# Patient Record
Sex: MALE | Race: WHITE | NOT HISPANIC OR LATINO | URBAN - METROPOLITAN AREA
[De-identification: names, ages, dates, MRNs, and addresses within clinical notes are randomized per-mention and may not be internally consistent; named-entity substitution may affect disease eponyms.]

---

## 2019-10-30 LAB — HBA1C MFR BLD: 5.6 % (ref 0–5.7)

## 2019-11-04 ENCOUNTER — TRANSFERRED RECORDS (OUTPATIENT)
Dept: HEALTH INFORMATION MANAGEMENT | Facility: CLINIC | Age: 49
End: 2019-11-04

## 2019-11-06 ENCOUNTER — TRANSFERRED RECORDS (OUTPATIENT)
Dept: HEALTH INFORMATION MANAGEMENT | Facility: CLINIC | Age: 49
End: 2019-11-06

## 2019-12-23 ENCOUNTER — TRANSFERRED RECORDS (OUTPATIENT)
Dept: HEALTH INFORMATION MANAGEMENT | Facility: CLINIC | Age: 49
End: 2019-12-23

## 2019-12-24 LAB
ANION GAP SERPL CALC-SCNC: 7.9 MMOL/L (ref 5–18)
BUN SERPL-MCNC: 18 MG/DL (ref 7–20)
CALCIUM (EXTERNAL): 8.8 MG/DL (ref 8.5–10.2)
CHLORIDE (EXTERNAL): 105 MMOL/L (ref 98–109)
CO2 (EXTERNAL): 29 MMOL/L (ref 21–32)
CREATININE (EXTERNAL): 1.07 MG/DL (ref 0.4–1.2)
ERYTHROCYTE [DISTWIDTH] IN BLOOD BY AUTOMATED COUNT: 12.8 % (ref 11–14.5)
GFR ESTIMATED (EXTERNAL): >60 ML/MIN/1.73M2
GFR ESTIMATED (IF AFRICAN AMERICAN) (EXTERNAL): ABNORMAL ML/MIN/1.73M2
GLUCOSE (EXTERNAL): 133 MG/DL (ref 70–99)
HEMATOCRIT (EXTERNAL): 39.4 % (ref 40–50)
HEMOGLOBIN (EXTERNAL): 13.3 G/DL (ref 13–17)
MCH RBC QN AUTO: 29.9 PG (ref 28–34)
MCHC RBC AUTO-ENTMCNC: 33.8 G/DL (ref 32–36)
MCV RBC AUTO: 88.5 FL (ref 81–98)
PLATELET COUNT (EXTERNAL): 213 10E3/UL (ref 150–400)
POTASSIUM (EXTERNAL): 3.9 MMOL/L (ref 3.5–5.2)
RBC # BLD AUTO: 4.45 10E6/UL (ref 4.2–5.7)
SODIUM (EXTERNAL): 138 MMOL/L (ref 135–145)
WBC COUNT (EXTERNAL): 10.2 10E3/UL (ref 4.5–10.5)

## 2021-07-01 NOTE — PROGRESS NOTES
3  SUBJECTIVE:   CC: Nick Riley is an 51 year old male who presents for preventive health visit.     Patient has been advised of split billing requirements and indicates understanding: Yes  Healthy Habits:    Do you get at least three servings of calcium containing foods daily (dairy, green leafy vegetables, etc.)? yes    Amount of exercise or daily activities, outside of work: hasn't been lately    Problems taking medications regularly not applicable    Medication side effects: No    Have you had an eye exam in the past two years? yes    Do you see a dentist twice per year? yes    Do you have sleep apnea, excessive snoring or daytime drowsiness?no      PROBLEMS TO ADD ON...  No    Today's PHQ-2 Score:   PHQ-2 ( 1999 Pfizer) 7/5/2021   Q1: Little interest or pleasure in doing things 0   Q2: Feeling down, depressed or hopeless 0   PHQ-2 Score 0       Abuse: Current or Past(Physical, Sexual or Emotional)- No  Do you feel safe in your environment? Yes        Social History     Tobacco Use     Smoking status: Never Smoker     Smokeless tobacco: Never Used   Substance Use Topics     Alcohol use: Yes     Frequency: 4 or more times a week     If you drink alcohol do you typically have >3 drinks per day or >7 drinks per week? No                      Last PSA: No results found for: PSA    Reviewed orders with patient. Reviewed health maintenance and updated orders accordingly - Yes  Colonoscopy performed int 2019, found benign polyps, recommended repeat study in 5 years. (2024)  Prostate screen performed today    Reviewed and updated as needed this visit by clinical staff  Tobacco  Allergies  Meds  Problems  Med Hx  Surg Hx  Fam Hx  Soc Hx          Reviewed and updated as needed this visit by Provider  Tobacco  Allergies  Meds  Problems  Med Hx  Surg Hx  Fam Hx         History reviewed. No pertinent past medical history.   Past Surgical History:   Procedure Laterality Date     SPINE SURGERY      12/23/2019  "cervical spine surgery: fusion of c4-c7, removed c6       ROS:  See history of spina surgery above. Patient reports his \"new normal\" includes occasional LEFT foot drop, left sided weakness and occasional paresthesias  CONSTITUTIONAL: NEGATIVE for fever, chills  INTEGUMENTARY/SKIN: NEGATIVE for worrisome rashes, moles or lesions  EYES: NEGATIVE for vision changes or irritation  ENT: NEGATIVE for ear, mouth and throat problems  RESP: NEGATIVE for significant cough or SOB  CV: NEGATIVE for chest pain, palpitations or peripheral edema  GI: NEGATIVE for nausea, abdominal pain, heartburn, or change in bowel habits   male: negative for dysuria, hematuria, decreased urinary stream, erectile dysfunction, urethral discharge  MUSCULOSKELETAL: NEGATIVE for significant arthralgias or myalgia  NEURO: NEGATIVE for weakness, dizziness or paresthesias  PSYCHIATRIC: NEGATIVE for changes in mood or affect    OBJECTIVE:   /78 (BP Location: Left arm, Patient Position: Sitting, Cuff Size: Adult Large)   Pulse 71   Temp 97.2  F (36.2  C) (Temporal)   Resp 16   Ht 1.753 m (5' 9\")   Wt 93.2 kg (205 lb 8 oz)   SpO2 96%   BMI 30.35 kg/m    EXAM:  GENERAL: healthy, alert and no distress  EYES: Eyes grossly normal to inspection, PERRL and conjunctivae and sclerae normal  HENT: ear canals and TM's normal, nose and mouth without ulcers or lesions  NECK: no adenopathy, no asymmetry, masses, or scars and thyroid normal to palpation  RESP: lungs clear to auscultation - no rales, rhonchi or wheezes  CV: regular rate and rhythm, normal S1 S2, no S3 or S4, no murmur, click or rub, no peripheral edema and peripheral pulses strong  ABDOMEN: soft, nontender, no hepatosplenomegaly, no masses and bowel sounds normal  MS: minimal strength deficit and musculare defects on patients LEFT side related to previous surgery noted above, no edema  SKIN: no suspicious lesions or rashes  NEURO: Normal strength and tone, mentation intact and speech " "normal  PSYCH: mentation appears normal, affect normal/bright    Diagnostic Test Results:  Labs reviewed in Epic    ASSESSMENT/PLAN:   Nick was seen today for physical.    Diagnoses and all orders for this visit:    Routine general medical examination at a health care facility    Screening for lipid disorders  -     Lipid Panel ([facility])  -     Basic metabolic panel    Screening for prostate cancer  -     Prostate spec antigen screen        Patient has been advised of split billing requirements and indicates understanding: Yes  COUNSELING:  Reviewed preventive health counseling, as reflected in patient instructions    Estimated body mass index is 30.35 kg/m  as calculated from the following:    Height as of this encounter: 1.753 m (5' 9\").    Weight as of this encounter: 93.2 kg (205 lb 8 oz).    Weight management plan: Discussed healthy diet and exercise guidelines    He reports that he has never smoked. He has never used smokeless tobacco.      Counseling Resources:  ATP IV Guidelines  Pooled Cohorts Equation Calculator  FRAX Risk Assessment  ICSI Preventive Guidelines  Dietary Guidelines for Americans, 2010  USDA's MyPlate  ASA Prophylaxis  Lung CA Screening    Roman Delgado MD  [facility]  "

## 2021-07-05 ENCOUNTER — OFFICE VISIT (OUTPATIENT)
Dept: FAMILY MEDICINE | Facility: CLINIC | Age: 51
End: 2021-07-05
Payer: COMMERCIAL

## 2021-07-05 VITALS
SYSTOLIC BLOOD PRESSURE: 122 MMHG | BODY MASS INDEX: 30.44 KG/M2 | OXYGEN SATURATION: 96 % | DIASTOLIC BLOOD PRESSURE: 78 MMHG | HEIGHT: 69 IN | WEIGHT: 205.5 LBS | TEMPERATURE: 97.2 F | HEART RATE: 71 BPM | RESPIRATION RATE: 16 BRPM

## 2021-07-05 DIAGNOSIS — Z13.220 SCREENING FOR LIPID DISORDERS: ICD-10-CM

## 2021-07-05 DIAGNOSIS — Z12.5 SCREENING FOR PROSTATE CANCER: ICD-10-CM

## 2021-07-05 DIAGNOSIS — Z00.00 ROUTINE GENERAL MEDICAL EXAMINATION AT A HEALTH CARE FACILITY: Primary | ICD-10-CM

## 2021-07-05 PROBLEM — L30.9 ECZEMA: Status: ACTIVE | Noted: 2021-07-05

## 2021-07-05 PROBLEM — R03.0 ELEVATED BLOOD-PRESSURE READING WITHOUT DIAGNOSIS OF HYPERTENSION: Status: ACTIVE | Noted: 2021-07-05

## 2021-07-05 PROBLEM — B35.4 TINEA CORPORIS: Status: RESOLVED | Noted: 2021-07-05 | Resolved: 2021-07-05

## 2021-07-05 PROBLEM — B35.4 TINEA CORPORIS: Status: ACTIVE | Noted: 2021-07-05

## 2021-07-05 PROBLEM — N52.8 OTHER MALE ERECTILE DYSFUNCTION: Status: ACTIVE | Noted: 2021-07-05

## 2021-07-05 PROBLEM — Z86.0100 HISTORY OF COLONIC POLYPS: Status: ACTIVE | Noted: 2021-07-05

## 2021-07-05 PROBLEM — L30.9 ECZEMA: Status: RESOLVED | Noted: 2021-07-05 | Resolved: 2021-07-05

## 2021-07-05 PROBLEM — E66.9 OBESITY WITH BODY MASS INDEX 30 OR GREATER: Status: ACTIVE | Noted: 2021-07-05

## 2021-07-05 LAB
ANION GAP SERPL CALCULATED.3IONS-SCNC: 4 MMOL/L (ref 3–14)
BUN SERPL-MCNC: 21 MG/DL (ref 7–30)
CALCIUM SERPL-MCNC: 8.9 MG/DL (ref 8.5–10.1)
CHLORIDE SERPL-SCNC: 106 MMOL/L (ref 94–109)
CHOLEST SERPL-MCNC: 182 MG/DL (ref 0–200)
CHOLEST/HDLC SERPL: 5.2 {RATIO} (ref 0–5)
CO2 SERPL-SCNC: 27 MMOL/L (ref 20–32)
CREAT SERPL-MCNC: 1 MG/DL (ref 0.66–1.25)
FASTING SPECIMEN: YES
GFR SERPL CREATININE-BSD FRML MDRD: 86 ML/MIN/{1.73_M2}
GLUCOSE SERPL-MCNC: 89 MG/DL (ref 70–99)
HDLC SERPL-MCNC: 35 MG/DL
LDLC SERPL CALC-MCNC: 109 MG/DL (ref 0–129)
POTASSIUM SERPL-SCNC: 4.4 MMOL/L (ref 3.4–5.3)
PSA SERPL-ACNC: 0.44 UG/L (ref 0–4)
SODIUM SERPL-SCNC: 138 MMOL/L (ref 133–144)
TRIGL SERPL-MCNC: 188 MG/DL (ref 0–150)
VLDL-CHOLESTEROL: 38 (ref 7–32)

## 2021-07-05 SDOH — HEALTH STABILITY: MENTAL HEALTH: HOW OFTEN DO YOU HAVE A DRINK CONTAINING ALCOHOL?: 4 OR MORE TIMES A WEEK

## 2021-07-05 SDOH — HEALTH STABILITY: MENTAL HEALTH: HOW OFTEN DO YOU HAVE 6 OR MORE DRINKS ON ONE OCCASION?: NOT ASKED

## 2021-07-05 SDOH — HEALTH STABILITY: MENTAL HEALTH: HOW MANY STANDARD DRINKS CONTAINING ALCOHOL DO YOU HAVE ON A TYPICAL DAY?: NOT ASKED

## 2021-07-05 ASSESSMENT — MIFFLIN-ST. JEOR: SCORE: 1772.52

## 2021-10-17 ENCOUNTER — HEALTH MAINTENANCE LETTER (OUTPATIENT)
Age: 51
End: 2021-10-17

## 2022-06-28 NOTE — PROGRESS NOTES
Assessment & Plan   Problem List Items Addressed This Visit        Nervous and Auditory    Nerve damage      Other Visit Diagnoses     Hematuria, unspecified type    -  Primary    Relevant Orders    Urinalysis Macroscopic (Completed)    Erectile dysfunction, unspecified erectile dysfunction type        Relevant Medications    sildenafil (VIAGRA) 25 MG tablet        Unclear cause for hematuria. Considered kidney stone vs infection vs medication vs dehydration vs trauma. Reassured with apparent resolution of symptoms. Encouraged Nick to monitor for possible return of symptoms as I would consider renal ultrasound vs CT vs referral to urology for consideration of cystoscopy.    Agreed to a trial of viagra today. Advised Nick to use with caution, especially initially, for concerns for possible hypotension and syncope.      29 minutes spent on the date of the encounter doing chart review, history and exam, documentation and further activities as noted.    Roman Delgado MD  [facility]    Subjective   Nick is a 52 year old presenting for the following health issues:  Kidney Problem (Patient started noticing blood in his urine on Sunday and had mild pain. But since then it has slowly subsided. )      HPI   Hematuria  - noticed it three days ago with some associated pain  (Of note, Nick has limited sensation in his penis due to previous medical issues as noted in chart. As he puts it, there has to be some pretty significant pain for him to feel anything.)   - initial void was clearly blood  - over the next 2-3 voids the blood we less present, as was the pain  - symptoms completely resolved but that night  - no return of symptoms since then  - no fever, chills, nausea  - can't really feel if there is flank pain on the RIGHT as noted above  - issues with sexual function as noted below have been present since before this new issue    ED  - since the loss of sensation noted above, Nick notes he can achieve an erection  but can have difficulty maintaining an erection  - interested in trying Viagra to see if that helps      Review of Systems   Constitutional, HEENT, cardiovascular, pulmonary, gi and gu systems are negative, except as otherwise noted.      Objective    /82 (BP Location: Right arm, Patient Position: Chair, Cuff Size: Adult Large)   Pulse 64   Temp 97.5  F (36.4  C) (Temporal)   Resp 16   Wt 95.7 kg (210 lb 14.1 oz)   SpO2 98%   BMI 31.14 kg/m    Body mass index is 31.14 kg/m .  Physical Exam   GENERAL: healthy, alert and no distress  NECK: no adenopathy, no asymmetry, masses, or scars and thyroid normal to palpation  RESP: lungs clear to auscultation - no rales, rhonchi or wheezes  CV: regular rate and rhythm, normal S1 S2, no S3 or S4, no murmur, click or rub, no peripheral edema and peripheral pulses strong  ABDOMEN: soft, nontender, no hepatosplenomegaly, no masses and bowel sounds normal   (male): normal male genitalia without lesions or urethral discharge, no hernia  MS: no gross musculoskeletal defects noted, no edema            .  ..

## 2022-06-29 ENCOUNTER — OFFICE VISIT (OUTPATIENT)
Dept: FAMILY MEDICINE | Facility: CLINIC | Age: 52
End: 2022-06-29
Payer: COMMERCIAL

## 2022-06-29 VITALS
BODY MASS INDEX: 31.14 KG/M2 | DIASTOLIC BLOOD PRESSURE: 82 MMHG | RESPIRATION RATE: 16 BRPM | HEART RATE: 64 BPM | SYSTOLIC BLOOD PRESSURE: 127 MMHG | OXYGEN SATURATION: 98 % | TEMPERATURE: 97.5 F | WEIGHT: 210.88 LBS

## 2022-06-29 DIAGNOSIS — N52.9 ERECTILE DYSFUNCTION, UNSPECIFIED ERECTILE DYSFUNCTION TYPE: ICD-10-CM

## 2022-06-29 DIAGNOSIS — R31.9 HEMATURIA, UNSPECIFIED TYPE: Primary | ICD-10-CM

## 2022-06-29 DIAGNOSIS — T14.8XXA NERVE DAMAGE: ICD-10-CM

## 2022-06-29 LAB
ALBUMIN UR-MCNC: NEGATIVE MG/DL
APPEARANCE UR: CLEAR
BILIRUB UR QL STRIP: NEGATIVE
COLOR UR AUTO: YELLOW
GLUCOSE UR STRIP-MCNC: NEGATIVE MG/DL
HGB UR QL STRIP: NEGATIVE
KETONES UR STRIP-MCNC: NEGATIVE MG/DL
LEUKOCYTE ESTERASE UR QL STRIP: NEGATIVE
NITRATE UR QL: NEGATIVE
PH UR STRIP: 7 [PH] (ref 5–7)
SP GR UR STRIP: 1.01 (ref 1–1.03)
UROBILINOGEN UR STRIP-ACNC: 0.2 E.U./DL

## 2022-06-29 RX ORDER — SILDENAFIL 25 MG/1
25 TABLET, FILM COATED ORAL DAILY PRN
Qty: 10 TABLET | Refills: 0 | Status: SHIPPED | OUTPATIENT
Start: 2022-06-29 | End: 2024-08-26

## 2022-06-29 ASSESSMENT — PAIN SCALES - GENERAL: PAINLEVEL: NO PAIN (0)

## 2022-06-29 NOTE — NURSING NOTE
52 year old  Chief Complaint   Patient presents with     Kidney Problem     Patient started noticing blood in his urine on Sunday and had mild pain. But since then it has slowly subsided.        Blood pressure 127/82, pulse 64, temperature 97.5  F (36.4  C), temperature source Temporal, resp. rate 16, weight 95.7 kg (210 lb 14.1 oz), SpO2 98 %. Body mass index is 31.14 kg/m .  Patient Active Problem List   Diagnosis     Elevated blood-pressure reading without diagnosis of hypertension     Hypogonadism     Obesity with body mass index 30 or greater     Other male erectile dysfunction     History of colonic polyps       Wt Readings from Last 2 Encounters:   06/29/22 95.7 kg (210 lb 14.1 oz)   07/05/21 93.2 kg (205 lb 8 oz)     BP Readings from Last 3 Encounters:   06/29/22 127/82   07/05/21 122/78         Current Outpatient Medications   Medication     cholecalciferol 50 MCG (2000 UT) CAPS     Multiple Vitamin (MULTI-VITAMIN DAILY PO)     No current facility-administered medications for this visit.       Social History     Tobacco Use     Smoking status: Never Smoker     Smokeless tobacco: Never Used   Substance Use Topics     Alcohol use: Yes     Drug use: Not Currently       Health Maintenance Due   Topic Date Due     ADVANCE CARE PLANNING  Never done     COLORECTAL CANCER SCREENING  Never done     HIV SCREENING  Never done     HEPATITIS C SCREENING  Never done     ZOSTER IMMUNIZATION (1 of 2) Never done     PREVENTIVE CARE VISIT  07/05/2022       No results found for: CARSON Mike CMA, [facility]  June 29, 2022 8:35 AM

## 2022-09-12 ENCOUNTER — NURSE TRIAGE (OUTPATIENT)
Dept: FAMILY MEDICINE | Facility: CLINIC | Age: 52
End: 2022-09-12

## 2022-09-12 NOTE — TELEPHONE ENCOUNTER
*Put Sx in reason for call  Symptom: Patient tested positive 9/11, current symptoms congestion, chills, headache, sore throat, and cough. Patient noticed first symptoms on Saturday afternoon.  Additional Comments:  Would like to speak with RN about what to do next

## 2022-09-12 NOTE — TELEPHONE ENCOUNTER
COVID Medication Reconciliation  ~Sildenafil - Hold x 8 days    Pharm[facility]:  [facility] (They do have Paxlovid in stock)    Reason for Disposition    [1] COVID-19 diagnosed by positive lab test (e.g., PCR, rapid self-test kit) AND [2] mild symptoms (e.g., cough, fever, others) AND [3] no complications or SOB    Answer Assessment - Initial Assessment Questions  1. COVID-19 DIAGNOSIS: Positive COVID test 9/11/22  2. COVID-19 EXPOSURE: Went to an event on 9/8/22  3. ONSET: Saturday 9/10/22  4. WORST SYMPTOM: Headache  5. COUGH: Intermittent   6. FEVER: Uncertain - hasn't checked it  7. RESPIRATORY STATUS: Breathing normally  8. BETTER-SAME-WORSE: Better, chills have subsided  9. HIGH RISK DISEASE: BMI > 30   10. VACCINE: Pfizer 3/19/21, 4/9/21  11. BOOSTER: Moderna 11/19/21, 6/5/22  12. PREGNANCY: N/A  13. OTHER SYMPTOMS: Sinus congestion, chills, sore throat and body aches  14. O2 SATURATION MONITOR:  No    Protocols used: CORONAVIRUS (COVID-19) DIAGNOSED OR [id number]    Isolation and over the counter medications discussed.  Nick would like to continue with these being he is feeling a little better than yesterday.  If he is feeling worse or not any better by Wednesday, which is his 5 day window for Paxlovid treatment, he will call for an appointment on Wednesday the 14th.  LYNNE McraeN, RN, [facility]  09/12/22  10:31 AM

## 2022-10-03 ENCOUNTER — HEALTH MAINTENANCE LETTER (OUTPATIENT)
Age: 52
End: 2022-10-03

## 2023-10-21 ENCOUNTER — HEALTH MAINTENANCE LETTER (OUTPATIENT)
Age: 53
End: 2023-10-21

## 2024-08-26 ENCOUNTER — OFFICE VISIT (OUTPATIENT)
Dept: FAMILY MEDICINE | Facility: CLINIC | Age: 54
End: 2024-08-26
Payer: COMMERCIAL

## 2024-08-26 VITALS
SYSTOLIC BLOOD PRESSURE: 134 MMHG | WEIGHT: 210 LBS | HEIGHT: 69 IN | OXYGEN SATURATION: 97 % | RESPIRATION RATE: 16 BRPM | DIASTOLIC BLOOD PRESSURE: 84 MMHG | TEMPERATURE: 98.1 F | BODY MASS INDEX: 31.1 KG/M2 | HEART RATE: 56 BPM

## 2024-08-26 DIAGNOSIS — Z13.228 SCREENING FOR METABOLIC DISORDER: ICD-10-CM

## 2024-08-26 DIAGNOSIS — Z00.00 WELLNESS EXAMINATION: Primary | ICD-10-CM

## 2024-08-26 DIAGNOSIS — Z13.220 LIPID SCREENING: ICD-10-CM

## 2024-08-26 DIAGNOSIS — Z12.11 SCREEN FOR COLON CANCER: ICD-10-CM

## 2024-08-26 DIAGNOSIS — Z12.5 SCREENING FOR PROSTATE CANCER: ICD-10-CM

## 2024-08-26 LAB
ANION GAP SERPL CALCULATED.3IONS-SCNC: 10 MMOL/L (ref 7–15)
BUN SERPL-MCNC: 12 MG/DL (ref 6–20)
CALCIUM SERPL-MCNC: 9.5 MG/DL (ref 8.8–10.4)
CHLORIDE SERPL-SCNC: 103 MMOL/L (ref 98–107)
CHOLEST SERPL-MCNC: 184 MG/DL
CREAT SERPL-MCNC: 1.04 MG/DL (ref 0.67–1.17)
EGFRCR SERPLBLD CKD-EPI 2021: 85 ML/MIN/1.73M2
FASTING STATUS PATIENT QL REPORTED: YES
FASTING STATUS PATIENT QL REPORTED: YES
GLUCOSE SERPL-MCNC: 100 MG/DL (ref 70–99)
HCO3 SERPL-SCNC: 26 MMOL/L (ref 22–29)
HDLC SERPL-MCNC: 29 MG/DL
LDLC SERPL CALC-MCNC: 122 MG/DL
NONHDLC SERPL-MCNC: 155 MG/DL
POTASSIUM SERPL-SCNC: 4.4 MMOL/L (ref 3.4–5.3)
PSA SERPL DL<=0.01 NG/ML-MCNC: 0.4 NG/ML (ref 0–3.5)
SODIUM SERPL-SCNC: 139 MMOL/L (ref 135–145)
TRIGL SERPL-MCNC: 166 MG/DL

## 2024-08-26 PROCEDURE — 80061 LIPID PANEL: CPT | Performed by: FAMILY MEDICINE

## 2024-08-26 PROCEDURE — 82374 ASSAY BLOOD CARBON DIOXIDE: CPT | Performed by: FAMILY MEDICINE

## 2024-08-26 PROCEDURE — 84153 ASSAY OF PSA TOTAL: CPT | Performed by: FAMILY MEDICINE

## 2024-08-26 SDOH — HEALTH STABILITY: PHYSICAL HEALTH: ON AVERAGE, HOW MANY MINUTES DO YOU ENGAGE IN EXERCISE AT THIS LEVEL?: 90 MIN

## 2024-08-26 SDOH — HEALTH STABILITY: PHYSICAL HEALTH: ON AVERAGE, HOW MANY DAYS PER WEEK DO YOU ENGAGE IN MODERATE TO STRENUOUS EXERCISE (LIKE A BRISK WALK)?: 2 DAYS

## 2024-08-26 ASSESSMENT — SOCIAL DETERMINANTS OF HEALTH (SDOH): HOW OFTEN DO YOU GET TOGETHER WITH FRIENDS OR RELATIVES?: ONCE A WEEK

## 2024-08-26 NOTE — NURSING NOTE
"54 year old  Chief Complaint   Patient presents with    Physical     No concerns.       Blood pressure 134/84, pulse 56, temperature 98.1  F (36.7  C), temperature source Skin, resp. rate 16, height 1.753 m (5' 9\"), weight 95.3 kg (210 lb), SpO2 97%. Body mass index is 31.01 kg/m .  Patient Active Problem List   Diagnosis    Elevated blood-pressure reading without diagnosis of hypertension    Hypogonadism    Obesity with body mass index 30 or greater    Other male erectile dysfunction    History of colonic polyps    Nerve damage       Wt Readings from Last 2 Encounters:   08/26/24 95.3 kg (210 lb)   06/29/22 95.7 kg (210 lb 14.1 oz)     BP Readings from Last 3 Encounters:   08/26/24 134/84   06/29/22 127/82   07/05/21 122/78         Current Outpatient Medications   Medication Sig Dispense Refill    cholecalciferol 50 MCG (2000 UT) CAPS every 24 hours      Multiple Vitamin (MULTI-VITAMIN DAILY PO) Multi Vitamin   QD      sildenafil (VIAGRA) 25 MG tablet Take 1 tablet (25 mg) by mouth daily as needed (erectile dysfunction) 10 tablet 0     No current facility-administered medications for this visit.       Social History     Tobacco Use    Smoking status: Never    Smokeless tobacco: Never   Substance Use Topics    Alcohol use: Yes     Comment: rarely    Drug use: Not Currently       Health Maintenance Due   Topic Date Due    ADVANCE CARE PLANNING  Never done    COLORECTAL CANCER SCREENING  Never done    HIV SCREENING  Never done    HEPATITIS C SCREENING  Never done    HEPATITIS B IMMUNIZATION (1 of 3 - 19+ 3-dose series) Never done    ZOSTER IMMUNIZATION (1 of 2) Never done    YEARLY PREVENTIVE VISIT  07/05/2022    COVID-19 Vaccine (6 - 2023-24 season) 09/01/2023    GLUCOSE  07/05/2024       No results found for: \"PAP\"      August 26, 2024 9:47 AM   "

## 2024-08-26 NOTE — PROGRESS NOTES
"Preventive Care Visit  [facility]  Roman Delgado MD, Family Medicine  Aug 26, 2024      Assessment & Plan   Problem List Items Addressed This Visit    None  Visit Diagnoses       Wellness examination    -  Primary    Lipid screening        Relevant Orders    Lipid Profile    Screening for metabolic disorder        Relevant Orders    Basic metabolic panel    Screening for prostate cancer        Relevant Orders    PSA tumor marker    Screen for colon cancer        Relevant Orders    Colonoscopy Screening  Referral             Patient has been advised of split billing requirements and indicates understanding: Yes       BMI  Estimated body mass index is 31.01 kg/m  as calculated from the following:    Height as of this encounter: 1.753 m (5' 9\").    Weight as of this encounter: 95.3 kg (210 lb).   Weight management plan: Discussed healthy diet and exercise guidelines    Counseling  Appropriate preventive services were addressed with this patient via screening, questionnaire, or discussion as appropriate for fall prevention, nutrition, physical activity, Tobacco-use cessation, social engagement, weight loss and cognition.  Checklist reviewing preventive services available has been given to the patient.  Reviewed patient's diet, addressing concerns and/or questions.   He is at risk for lack of exercise and has been provided with information to increase physical activity for the benefit of his well-being.   He is at risk for psychosocial distress and has been provided with information to reduce risk.     Roman Delgado MD  10:16 AM, August 26, 2024      Alex   Nick is a 54 year old, presenting for the following:  Physical (No concerns.)         Health Care Directive  Patient does not have a Health Care Directive or Living Will: Discussed advance care planning with patient; information given to patient to review.    HPI  # Health Maintenance  - BP:   BP Readings from Last 3 Encounters:   08/26/24 134/84 "   06/29/22 127/82   07/05/21 122/78   - Cholesterol: pending  Recent Labs   Lab Test 07/05/21  1036   CHOL 182.0   HDL 35.0*   .0   TRIG 188.0*   The 10-year ASCVD risk score (Jaxon HERNANDEZ, et al., 2019) is: 8.3%*    Values used to calculate the score:      Age: 54 years      Sex: Male      Is Non- : No      Diabetic: No      Tobacco smoker: No      Systolic Blood Pressure: 134 mmHg      Is BP treated: No      HDL Cholesterol: 35 mg/dL*      Total Cholesterol: 5.2 mmol/L*      * - Cholesterol units were assumed for this score calculation  - Diabetes Screening: pending  - Lung Cancer Screening: not indicated  55-79yo w/30py smoking history and currently smoking OR quit within past 15 years:  Low dose CT annually and discontinued once a person has been 15 years tobacco free  - (+) seatbelt use, (+) helmet, (+) smoke detector  - Feels safe at home, denies verbal/physical/emotional abuse in past year: yes          8/26/2024   General Health   How would you rate your overall physical health? Good   Feel stress (tense, anxious, or unable to sleep) Only a little      (!) STRESS CONCERN      8/26/2024   Nutrition   Three or more servings of calcium each day? Yes   Diet: Regular (no restrictions)   How many servings of fruit and vegetables per day? (!) 2-3   How many sweetened beverages each day? 0-1            8/26/2024   Exercise   Days per week of moderate/strenous exercise 2 days   Average minutes spent exercising at this level 90 min      (!) EXERCISE CONCERN      8/26/2024   Social Factors   Frequency of gathering with friends or relatives Once a week   Worry food won't last until get money to buy more No   Food not last or not have enough money for food? No   Do you have housing? (Housing is defined as stable permanent housing and does not include staying ouside in a car, in a tent, in an abandoned building, in an overnight shelter, or couch-surfing.) Yes   Are you worried about losing your  housing? No   Lack of transportation? No   Unable to get utilities (heat,electricity)? No            8/26/2024   Fall Risk   Fallen 2 or more times in the past year? No   Trouble with walking or balance? Yes   Reason Gait Speed Test Not Completed Patient declines   Reason for decline Patient had spine surgery.             8/26/2024   Dental   Dentist two times every year? Yes            8/26/2024   TB Screening   Were you born outside of the [place]? No            Today's PHQ-2 Score:       8/26/2024     7:07 AM   PHQ-2 ( 1999 Pfizer)   Q1: Little interest or pleasure in doing things 0   Q2: Feeling down, depressed or hopeless 0   PHQ-2 Score 0   Q1: Little interest or pleasure in doing things Not at all   Q2: Feeling down, depressed or hopeless Not at all   PHQ-2 Score 0           8/26/2024   Substance Use   Alcohol more than 3/day or more than 7/wk No   Do you use any other substances recreationally? No        Social History     Tobacco Use    Smoking status: Never    Smokeless tobacco: Never   Substance Use Topics    Alcohol use: Yes     Comment: rarely    Drug use: Not Currently           8/26/2024   One time HIV Screening   Previous HIV test? Yes          8/26/2024   STI Screening   New sexual partner(s) since last STI/HIV test? No      Last PSA:   PSA   Date Value Ref Range Status   07/05/2021 0.44 0 - 4 ug/L Final     Comment:     Assay Method:  Chemiluminescence using Siemens Vista analyzer       Past Medical History:   Diagnosis Date    Nerve damage 6/29/2022    RLE, groin, and torso has no pain sensation     Past Surgical History:   Procedure Laterality Date    SPINE SURGERY      12/23/2019 cervical spine surgery: fusion of c4-c7, removed c6         Review of Systems  Constitutional, HEENT, cardiovascular, pulmonary, gi and gu systems are negative, except as otherwise noted.     Objective    Exam  /84 (BP Location: Left arm, Patient Position: Sitting, Cuff Size: Adult Regular)   Pulse 56   Temp 98.1  F  "(36.7  C) (Skin)   Resp 16   Ht 1.753 m (5' 9\")   Wt 95.3 kg (210 lb)   SpO2 97%   BMI 31.01 kg/m     Estimated body mass index is 31.01 kg/m  as calculated from the following:    Height as of this encounter: 1.753 m (5' 9\").    Weight as of this encounter: 95.3 kg (210 lb).    Physical Exam  GENERAL: alert and no distress  NECK: no adenopathy, no asymmetry, masses, or scars  RESP: lungs clear to auscultation - no rales, rhonchi or wheezes  CV: regular rate and rhythm, normal S1 S2, no S3 or S4, no murmur, click or rub, no peripheral edema  ABDOMEN: soft, nontender, no hepatosplenomegaly, no masses and bowel sounds normal  MS: no gross musculoskeletal defects noted, no edema        Signed Electronically by: Roman Delgado MD    "

## 2024-08-27 ENCOUNTER — MYC MEDICAL ADVICE (OUTPATIENT)
Dept: FAMILY MEDICINE | Facility: CLINIC | Age: 54
End: 2024-08-27

## 2024-08-27 ENCOUNTER — PATIENT OUTREACH (OUTPATIENT)
Dept: GASTROENTEROLOGY | Facility: CLINIC | Age: 54
End: 2024-08-27
Payer: COMMERCIAL

## 2024-08-27 DIAGNOSIS — Z71.89 ENCOUNTER FOR CARDIAC RISK COUNSELING: Primary | ICD-10-CM

## 2024-08-27 NOTE — TELEPHONE ENCOUNTER
correspondence. Coronary calcium CT scan as ordered.     Diagnoses and all orders for this visit:    Encounter for cardiac risk counseling  -     CT Coronary Calcium Scan; Future      Roman Delgado MD  1:42 PM, August 27, 2024

## 2024-09-05 ENCOUNTER — TELEPHONE (OUTPATIENT)
Dept: GASTROENTEROLOGY | Facility: CLINIC | Age: 54
End: 2024-09-05
Payer: COMMERCIAL

## 2024-09-05 NOTE — TELEPHONE ENCOUNTER
"Endoscopy Scheduling Screen    Have you had a positive Covid test in the last 14 days?  No    What is your communication preference for Instructions and/or Bowel Prep?   MyChart    What insurance is in the chart?  Other:  BC[employer]    Ordering/Referring Provider:     GOOD MESSINA      (If ordering provider performs procedure, schedule with ordering provider unless otherwise instructed. )    BMI: Estimated body mass index is 31.01 kg/m  as calculated from the following:    Height as of 8/26/24: 1.753 m (5' 9\").    Weight as of 8/26/24: 95.3 kg (210 lb).     Sedation Ordered  moderate sedation.   If patient BMI > 50 do not schedule in [facility].    If patient BMI > 45 do not schedule at [facility].    Are you taking methadone or Suboxone?  No    Have you had difficulties, pain, or discomfort during past endoscopy procedures?  No    Are you taking any prescription medications for pain 3 or more times per week?   NO, No RN review required.    Do you have a history of malignant hyperthermia?  No    (Females) Are you currently pregnant?        Have you been diagnosed or told you have pulmonary hypertension?   No    Do you have an LVAD?  No    Have you been told you have moderate to severe sleep apnea?  No    Have you been told you have COPD, asthma, or any other lung disease?  No    Do you have any heart conditions?  No     Have you ever had or are you waiting for an organ transplant?  No. Continue scheduling, no site restrictions.    Have you had a stroke or transient ischemic attack (TIA aka \"mini stroke\" in the last 6 months?   No    Have you been diagnosed with or been told you have cirrhosis of the liver?   No    Are you currently on dialysis?   No    Do you need assistance transferring?   No    BMI: Estimated body mass index is 31.01 kg/m  as calculated from the following:    Height as of 8/26/24: 1.753 m (5' 9\").    Weight as of 8/26/24: 95.3 kg (210 lb).     Is patients BMI > 40 and scheduling location [facility]?  No    Do you take " an injectable medication for weight loss or diabetes (excluding insulin)?  No    Do you take the medication Naltrexone?  No    Do you take blood thinners?  No       Prep   Are you currently on dialysis or do you have chronic kidney disease?  No    Do you have a diagnosis of diabetes?  No    Do you have a diagnosis of cystic fibrosis (CF)?  No    On a regular basis do you go 3 -5 days between bowel movements?  No    BMI > 40?  No    Preferred Pharmacy:    [id number] [address]  [address] [place] [id number]  Phone: [phone] Fax: [phone]      Final Scheduling Details     Procedure scheduled  Colonoscopy    Surgeon:  JENNIFER     Date of procedure:  12/27     Pre-OP / PAC:   No - Not required for this site.    Location  [facility] - [facility] - Per order.    Sedation   Moderate Sedation - Per order.      Patient Reminders:   You will receive a call from a Nurse to review instructions and health history.  This assessment must be completed prior to your procedure.  Failure to complete the Nurse assessment may result in the procedure being cancelled.      On the day of your procedure, please designate an adult(s) who can drive you home stay with you for the next 24 hours. The medicines used in the exam will make you sleepy. You will not be able to drive.      You cannot take public transportation, ride share services, or non-medical taxi service without a responsible caregiver.  Medical transport services are allowed with the requirement that a responsible caregiver will receive you at your destination.  We require that drivers and caregivers are confirmed prior to your procedure.

## 2024-09-06 ENCOUNTER — HOSPITAL ENCOUNTER (OUTPATIENT)
Dept: CT IMAGING | Facility: CLINIC | Age: 54
Discharge: HOME | End: 2024-09-06
Attending: FAMILY MEDICINE | Admitting: FAMILY MEDICINE
Payer: COMMERCIAL

## 2024-09-06 DIAGNOSIS — Z71.89 ENCOUNTER FOR CARDIAC RISK COUNSELING: ICD-10-CM

## 2024-09-06 PROCEDURE — 75571 CT HRT W/O DYE W/CA TEST: CPT | Mod: 26 | Performed by: INTERNAL MEDICINE

## 2024-09-06 PROCEDURE — 75571 CT HRT W/O DYE W/CA TEST: CPT

## 2024-12-09 ENCOUNTER — MYC MEDICAL ADVICE (OUTPATIENT)
Dept: GASTROENTEROLOGY | Facility: CLINIC | Age: 54
End: 2024-12-09
Payer: COMMERCIAL

## 2024-12-12 NOTE — TELEPHONE ENCOUNTER
Colonoscopy     Procedure date: 12/27/24    Anticipated arrival time: 0845 AM   (Please note that arrival times may change)    [facility]; [address], [address]th Floor, [place] [address] - Check in location: 5th Floor. Parking information: Self pay parking is available in [address] Lot located directly across from the main entrance of the [facility]. Entry and exit to this lot is on [facility]. In addition, self pay parking is available in [place] [employer] [address] which is located west of [facility]. Follow the center lilliam designated ' [place]' to ground-level spaces that are reserved for patients. Please disregard any signage indicating the ramp is full or available by reservation only as this does not pertain to the spaces dedicated for patients coming to the clinic.  services are available for patients with limited mobility. Services available Monday-Friday, 7:00a.m.-  5:00p.m.    Important Procedure Reminders:     Prep Instructions:   Instructions on how to prepare for your upcoming procedure are found below. Please read instructions carefully. Deviation from instructions may result in less than desired outcomes and procedure may need to be rescheduled.   If you have additional questions regarding how to prepare for your upcoming procedure, contact our endoscopy pre assessment nurses at [phone] option 2 Monday through Friday 7:00am-5:00pm.      Policy:   The medications used during the procedure will make you sleepy, so you won't be able to drive. On the day of your procedure, please have an adult ready to drive you home and stay with you for the next 6 hours.   You can't use public transportation, ride-share services, or non-medical taxi services without a responsible caregiver. Medical transport services are okay, but a caregiver must be there to receive you at your destination.   Make sure your  and caregiver are confirmed before your procedure.    Day  of procedure:  Please keep in mind that arrival times may change. Let your  know there might be a one-hour window for changes.  We ask that you please check in at the  with your . Your  should remain on campus.  Expect to be at the procedure center for about 1.5-2.5 hours.    Please do not wear jewelry (i.e. earrings, rings, necklaces, watches, etc). Leave your purse, billfold, credit cards, and other valuables at home.   Bring insurance card and ID.     To cancel or reschedule your procedure:   If you need to cancel or reschedule, our endoscopy scheduling team can be reached at [phone], option 2. Monday through Friday, 7:00am-5:00pm.    Medication Reminders:    Please note the following medication holding recommendations:   N/A    ---------------------------------------------------------------------------------------------------------------------------------------------------------------------------------------------------------------

## 2024-12-27 ENCOUNTER — HOSPITAL ENCOUNTER (OUTPATIENT)
Facility: AMBULATORY SURGERY CENTER | Age: 54
Discharge: HOME | End: 2024-12-27
Attending: STUDENT IN AN ORGANIZED HEALTH CARE EDUCATION/TRAINING PROGRAM | Admitting: STUDENT IN AN ORGANIZED HEALTH CARE EDUCATION/TRAINING PROGRAM
Payer: COMMERCIAL

## 2024-12-27 VITALS
SYSTOLIC BLOOD PRESSURE: 125 MMHG | HEIGHT: 69 IN | RESPIRATION RATE: 16 BRPM | WEIGHT: 208 LBS | TEMPERATURE: 98.6 F | OXYGEN SATURATION: 97 % | BODY MASS INDEX: 30.81 KG/M2 | HEART RATE: 87 BPM | DIASTOLIC BLOOD PRESSURE: 85 MMHG

## 2024-12-27 LAB — COLONOSCOPY: NORMAL

## 2024-12-27 PROCEDURE — 88305 TISSUE EXAM BY PATHOLOGIST: CPT | Mod: 26 | Performed by: PATHOLOGY

## 2024-12-27 PROCEDURE — 88305 TISSUE EXAM BY PATHOLOGIST: CPT | Mod: TC | Performed by: STUDENT IN AN ORGANIZED HEALTH CARE EDUCATION/TRAINING PROGRAM

## 2024-12-27 PROCEDURE — 45380 COLONOSCOPY AND BIOPSY: CPT | Mod: 33 | Performed by: STUDENT IN AN ORGANIZED HEALTH CARE EDUCATION/TRAINING PROGRAM

## 2024-12-27 RX ORDER — NALOXONE HYDROCHLORIDE 0.4 MG/ML
0.4 INJECTION, SOLUTION INTRAMUSCULAR; INTRAVENOUS; SUBCUTANEOUS
Status: DISCONTINUED | OUTPATIENT
Start: 2024-12-27 | End: 2024-12-28 | Stop reason: HOSPADM

## 2024-12-27 RX ORDER — FLUMAZENIL 0.1 MG/ML
0.2 INJECTION, SOLUTION INTRAVENOUS
Status: ACTIVE | OUTPATIENT
Start: 2024-12-27 | End: 2024-12-27

## 2024-12-27 RX ORDER — NALOXONE HYDROCHLORIDE 0.4 MG/ML
0.2 INJECTION, SOLUTION INTRAMUSCULAR; INTRAVENOUS; SUBCUTANEOUS
Status: DISCONTINUED | OUTPATIENT
Start: 2024-12-27 | End: 2024-12-28 | Stop reason: HOSPADM

## 2024-12-27 RX ORDER — PROCHLORPERAZINE MALEATE 10 MG
10 TABLET ORAL EVERY 6 HOURS PRN
Status: DISCONTINUED | OUTPATIENT
Start: 2024-12-27 | End: 2024-12-28 | Stop reason: HOSPADM

## 2024-12-27 RX ORDER — ONDANSETRON 2 MG/ML
4 INJECTION INTRAMUSCULAR; INTRAVENOUS
Status: DISCONTINUED | OUTPATIENT
Start: 2024-12-27 | End: 2024-12-27 | Stop reason: HOSPADM

## 2024-12-27 RX ORDER — FENTANYL CITRATE 50 UG/ML
INJECTION, SOLUTION INTRAMUSCULAR; INTRAVENOUS PRN
Status: DISCONTINUED | OUTPATIENT
Start: 2024-12-27 | End: 2024-12-27 | Stop reason: HOSPADM

## 2024-12-27 RX ORDER — ONDANSETRON 2 MG/ML
4 INJECTION INTRAMUSCULAR; INTRAVENOUS EVERY 6 HOURS PRN
Status: DISCONTINUED | OUTPATIENT
Start: 2024-12-27 | End: 2024-12-28 | Stop reason: HOSPADM

## 2024-12-27 RX ORDER — ONDANSETRON 4 MG/1
4 TABLET, ORALLY DISINTEGRATING ORAL EVERY 6 HOURS PRN
Status: DISCONTINUED | OUTPATIENT
Start: 2024-12-27 | End: 2024-12-28 | Stop reason: HOSPADM

## 2024-12-27 RX ORDER — LIDOCAINE 40 MG/G
CREAM TOPICAL
Status: DISCONTINUED | OUTPATIENT
Start: 2024-12-27 | End: 2024-12-27 | Stop reason: HOSPADM

## 2024-12-27 RX ORDER — SODIUM CHLORIDE, SODIUM LACTATE, POTASSIUM CHLORIDE, CALCIUM CHLORIDE 600; 310; 30; 20 MG/100ML; MG/100ML; MG/100ML; MG/100ML
INJECTION, SOLUTION INTRAVENOUS CONTINUOUS
Status: DISCONTINUED | OUTPATIENT
Start: 2024-12-27 | End: 2024-12-27 | Stop reason: HOSPADM

## 2024-12-27 NOTE — H&P
Nick Riley  [id number]  male  54 year old    Reason for procedure/surgery: 54M with history of obesity who presents for screening colonoscopy. He reports a prior colonoscopy 5 years ago in [place] with removal of polyps.    Patient Active Problem List   Diagnosis    Elevated blood-pressure reading without diagnosis of hypertension    Obesity with body mass index 30 or greater    Other male erectile dysfunction    History of colonic polyps    Nerve damage       Past Surgical History:    Past Surgical History:   Procedure Laterality Date    SPINE SURGERY      12/23/2019 cervical spine surgery: fusion of c4-c7, removed c6       Past Medical History:   Past Medical History:   Diagnosis Date    Nerve damage 6/29/2022    RLE, groin, and torso has no pain sensation       Social History:   Social History     Tobacco Use    Smoking status: Never    Smokeless tobacco: Never   Substance Use Topics    Alcohol use: Yes     Comment: rarely       Family History:   Family History   Problem Relation Age of Onset    Arrhythmia Father     No Known Problems Sister        Allergies: No Known Allergies    Active Medications:   No current outpatient medications on file.       Systemic Review:   CONSTITUTIONAL: NEGATIVE for fever, chills, change in weight  ENT/MOUTH: NEGATIVE for ear, mouth and throat problems  RESP: NEGATIVE for significant cough or SOB  CV: NEGATIVE for chest pain, palpitations or peripheral edema    Physical Examination:   Vital Signs: There were no vitals taken for this visit.  GENERAL: healthy, alert and no distress  NECK: no adenopathy, no asymmetry, masses, or scars  RESP: lungs clear to auscultation - no rales, rhonchi or wheezes  CV: regular rate and rhythm, normal S1 S2, no S3 or S4, no murmur, click or rub, no peripheral edema and peripheral pulses strong  ABDOMEN: soft, nontender, no hepatosplenomegaly, no masses and bowel sounds normal  MS: no gross musculoskeletal defects noted, no edema    I examined  patient s dentition and informed the patient that dental injuries are a risk of any anesthetic management. No concerns were noted with this patient's dentition. . The patient has consented to proceed with the procedure.    Plan: Appropriate to proceed as scheduled.      Nat Vang MD  12/27/2024    PCP:  Roman Delgado

## 2024-12-30 LAB
PATH REPORT.COMMENTS IMP SPEC: NORMAL
PATH REPORT.COMMENTS IMP SPEC: NORMAL
PATH REPORT.FINAL DX SPEC: NORMAL
PATH REPORT.GROSS SPEC: NORMAL
PATH REPORT.MICROSCOPIC SPEC OTHER STN: NORMAL
PATH REPORT.RELEVANT HX SPEC: NORMAL
PHOTO IMAGE: NORMAL

## (undated) DEVICE — GOWN IMPERVIOUS 2XL BLUE

## (undated) DEVICE — ENDO FORCEP SPIKED SERRATED SHAFT JUMBO 239CM G56998

## (undated) DEVICE — SOL WATER IRRIG 500ML BOTTLE 2F7113

## (undated) DEVICE — SPECIMEN CONTAINER 3OZ W/FORMALIN 59901

## (undated) DEVICE — TUBING SUCTION MEDI-VAC 1/4"X20' N620A

## (undated) DEVICE — KIT ENDO TURNOVER/PROCEDURE CARRY-ON 101822

## (undated) DEVICE — SUCTION MANIFOLD NEPTUNE 2 SYS 1 PORT 702-025-000

## (undated) RX ORDER — FENTANYL CITRATE 50 UG/ML
INJECTION, SOLUTION INTRAMUSCULAR; INTRAVENOUS
Status: DISPENSED
Start: 2024-12-27